# Patient Record
Sex: FEMALE | Race: WHITE | Employment: FULL TIME | URBAN - METROPOLITAN AREA
[De-identification: names, ages, dates, MRNs, and addresses within clinical notes are randomized per-mention and may not be internally consistent; named-entity substitution may affect disease eponyms.]

---

## 2017-09-29 ENCOUNTER — OFFICE VISIT (OUTPATIENT)
Dept: INTERNAL MEDICINE CLINIC | Age: 22
End: 2017-09-29

## 2017-09-29 VITALS
OXYGEN SATURATION: 99 % | DIASTOLIC BLOOD PRESSURE: 73 MMHG | WEIGHT: 192 LBS | HEIGHT: 67 IN | HEART RATE: 80 BPM | SYSTOLIC BLOOD PRESSURE: 119 MMHG | TEMPERATURE: 97.4 F | BODY MASS INDEX: 30.13 KG/M2 | RESPIRATION RATE: 18 BRPM

## 2017-09-29 DIAGNOSIS — N63.0 BREAST LUMP IN LOWER OUTER QUADRANT: Primary | ICD-10-CM

## 2017-09-29 NOTE — PROGRESS NOTES
Yovani Jones presents today for right breast lump x notices last night. Yovani Jones preferred language for health care discussion is english/other. Is someone accompanying this pt? Yes; mother    Is the patient using any DME equipment during [address]? No    Depression Screening:  PHQ over the last two weeks 9/29/2017   Little interest or pleasure in doing things Not at all   Feeling down, depressed or hopeless Not at all   Total Score PHQ 2 0       Learning Assessment:  Completed     Abuse Screening:  Completed    Fall Risk  N/I    Health Maintenance reviewed and discussed per provider. Yes    Yovani Jones is due for influenza. Please order/place referral if appropriate. Advance Directive:  1. Do you have an advance directive in place? Patient Reply: No    2. If not, would you like material regarding how to put one in place? Patient Reply:  no    Coordination of Care:  1. Have you been to the ER, urgent care clinic since your last visit? Hospitalized since your last visit? no    2. Have you seen or consulted any other health care providers outside of the [address] [place] [address] since your last visit? No

## 2017-09-29 NOTE — PROGRESS NOTES
HISTORY OF PRESENT ILLNESS  Naila Villalba is a 25 y.o. female. HPI Comments: Patient noticed a lump in her right breast yesterday, no pain, no drainage. Breast Problem   The history is provided by the patient. This is a new problem. The current episode started yesterday. The problem occurs constantly. The problem has not changed since onset. Pertinent negatives include no chest pain, no abdominal pain and no headaches. Nothing aggravates the symptoms. Nothing relieves the symptoms. She has tried nothing for the symptoms. The treatment provided no relief. Review of Systems   Constitutional: Negative for chills, fever and malaise/fatigue. Cardiovascular: Negative for chest pain and palpitations. Gastrointestinal: Negative for abdominal pain, heartburn, nausea and vomiting. Musculoskeletal: Negative for myalgias. Neurological: Negative for dizziness and headaches. Endo/Heme/Allergies: Negative for environmental allergies and polydipsia. Does not bruise/bleed easily. Psychiatric/Behavioral: Negative for depression. Physical Exam   Constitutional: She is oriented to person, place, and time. She appears well-developed. HENT:   Head: Normocephalic. Cardiovascular: Regular rhythm. Pulmonary/Chest: Breath sounds normal. Right breast exhibits mass. Right breast exhibits no inverted nipple, no nipple discharge, no skin change and no tenderness. Left breast exhibits mass. Left breast exhibits no inverted nipple, no nipple discharge, no skin change and no tenderness. Breasts are symmetrical.       Neurological: She is alert and oriented to person, place, and time. Skin: Skin is dry. No erythema. Psychiatric: She has a normal mood and affect. Nursing note and vitals reviewed. ASSESSMENT and PLAN    ICD-10-CM ICD-9-CM    1. Breast lump in lower outer quadrant N63 611.72 REFERRAL TO GYN ONCOLOGY   patient denied to have mammogram ordered at this time.  She said she will wait till start of next menstrual cycle to re evaluation. Diagnosis and course of action discussed with the patient, she verbalized the understanding.

## 2017-09-29 NOTE — MR AVS SNAPSHOT
Visit Information Date & Time Provider Department Dept. Phone Encounter #  
 9/29/2017  4:15 PM Jennifer Chavez NP [facility] [phone] [id number] Upcoming Health Maintenance Date Due  
 HPV AGE 9Y-34Y (1 of 3 - Female 3 Dose Series) 9/4/2007 Pneumococcal 19-64 Medium Risk (1 of 1 - PPSV23) 9/4/2015 INFLUENZA AGE 9 TO ADULT 8/1/2017 DTaP/Tdap/Td series (1 - Tdap) 9/4/2017 PAP AKA CERVICAL CYTOLOGY 9/4/2017 Allergies as of 9/29/2017  Review Complete On: 9/29/2017 By: Karma Ibrahim LPN No Known Allergies Current Immunizations  Never Reviewed No immunizations on file. Not reviewed this visit You Were Diagnosed With   
  
 Codes Comments Breast lump in lower outer quadrant    -  Primary ICD-10-CM: N63 
ICD-9-CM: 611.72 Vitals BP Pulse Temp Resp Height(growth percentile) Weight(growth percentile) 119/73 (BP 1 Location: Left arm, BP Patient Position: Sitting) 80 97.4 °F (36.3 °C) (Oral) 18 5' 7\" (1.702 m) 192 lb (87.1 kg) LMP SpO2 BMI OB Status Smoking Status 09/13/2017 (Approximate) 99% 30.07 kg/m2 Having regular periods Current Every Day Smoker Vitals History BMI and BSA Data Body Mass Index Body Surface Area 30.07 kg/m 2 2.03 m 2 Preferred Pharmacy Pharmacy Name Phone [address] [address] [phone] Your Updated Medication List  
  
Notice  As of 9/29/2017  4:49 PM  
 You have not been prescribed any medications. We Performed the Following REFERRAL TO GYN ONCOLOGY [BPM91 Custom] Referral Information Referral ID Referred By Referred To  
  
 [id number] Bety Bean Not Available Visits Status Start Date End Date 1 New Request 9/29/17 9/29/18 If your referral has a status of pending review or denied, additional information will be sent to support the outcome of this decision. Patient Instructions Breast Lumps: Care Instructions Your Care Instructions Breast lumps are common, especially in women between ages 27 and 48. Many womens breasts feel lumpy and tender before their menstrual period. Women also may have lumps when they are breastfeeding. Breast lumps may go away after menopause. All new breast lumps in women after menopause should be checked by a doctor. Although lumps may be normal for you, it is important to have your doctor check any lump or thickness that is not like the rest of your breast to make sure it is not cancer. A lump may be larger, harder, or different from the rest of your breast tissue. Follow-up care is a key part of your treatment and safety. Be sure to make and go to all appointments, and call your doctor if you are having problems. Its also a good idea to know your test results and keep a list of the medicines you take. How can you care for yourself at home? · Make an appointment to have a mammogram and other follow-up visits as recommended by your doctor. When should you call for help? Call your doctor now or seek immediate medical care if: 
· You have new changes in a breast, such as: ¨ A lump or thickening in your breast or armpit. ¨ A change in the breast's size or shape. ¨ Skin changes, such as dimples or puckers. ¨ Nipple discharge. ¨ A change in the color or feel of the skin of your breast or the darker area around the nipple (areola). ¨ A change in the shape of the nipple (it may look like it's being pulled into the breast). · You have symptoms of a breast infection, such as: 
¨ Increased pain, swelling, redness, or warmth around a breast. 
¨ Red streaks extending from the breast. 
¨ Pus draining from a breast. 
¨ A fever. Watch closely for changes in your health, and be sure to contact your doctor if: 
· You do not get better as expected. Where can you learn more? Go to http://fransico-duglas.info/. Enter U888 in the search box to learn more about \"Breast Lumps: Care Instructions. \" Current as of: October 13, 2016 Content Version: 11.3 © 0654-7360 [employer]. Care instructions adapted under license by [employer] (which disclaims liability or warranty for this information). If you have questions about a medical condition or this instruction, always ask your healthcare professional. [place] [address] any warranty or liability for your use of this information. Introducing [facility]! [employer] introduces [employer] patient portal. Now you can access parts of your medical record, email your doctor's office, and request medication refills online. 1. In your internet browser, go to [email or web address] 2. Click on the First Time User? Click Here link in the Sign In box. You will see the New Member Sign Up page. 3. Enter your [employer] Access Code exactly as it appears below. You will not need to use this code after youve completed the sign-up process. If you do not sign up before the expiration date, you must request a new code. · [employer] Access Code: [email or web address] Expires: 12/28/2017  4:48 PM 
 
4. Enter the last four digits of your Social Security Number (xxxx) and Date of Birth (mm/dd/yyyy) as indicated and click Submit. You will be taken to the next sign-up page. 5. Create a [employer] ID. This will be your [employer] login ID and cannot be changed, so think of one that is secure and easy to remember. 6. Create a [employer] password. You can change your password at any time. 7. Enter your Password Reset Question and Answer. This can be used at a later time if you forget your password. 8. Enter your e-mail address. You will receive e-mail notification when new information is available in [address] 9. Click Sign Up. You can now view and download portions of your medical record. 10. Click the Download Summary menu link to download a portable copy of your medical information. If you have questions, please visit the Frequently Asked Questions section of the [email or web address] website. Remember, [email or web address] is NOT to be used for urgent needs. For medical emergencies, dial 911. Now available from your iPhone and Android! Please provide this summary of care documentation to your next provider. Your primary care clinician is listed as NONE. If you have any questions after today's visit, please call [phone].

## 2017-09-29 NOTE — PATIENT INSTRUCTIONS
Breast Lumps: Care Instructions  Your Care Instructions  Breast lumps are common, especially in women between ages 27 and 48. Many womens breasts feel lumpy and tender before their menstrual period. Women also may have lumps when they are breastfeeding. Breast lumps may go away after menopause. All new breast lumps in women after menopause should be checked by a doctor. Although lumps may be normal for you, it is important to have your doctor check any lump or thickness that is not like the rest of your breast to make sure it is not cancer. A lump may be larger, harder, or different from the rest of your breast tissue. Follow-up care is a key part of your treatment and safety. Be sure to make and go to all appointments, and call your doctor if you are having problems. Its also a good idea to know your test results and keep a list of the medicines you take. How can you care for yourself at home? · Make an appointment to have a mammogram and other follow-up visits as recommended by your doctor. When should you call for help? Call your doctor now or seek immediate medical care if:  · You have new changes in a breast, such as:  ¨ A lump or thickening in your breast or armpit. ¨ A change in the breast's size or shape. ¨ Skin changes, such as dimples or puckers. ¨ Nipple discharge. ¨ A change in the color or feel of the skin of your breast or the darker area around the nipple (areola). ¨ A change in the shape of the nipple (it may look like it's being pulled into the breast). · You have symptoms of a breast infection, such as:  ¨ Increased pain, swelling, redness, or warmth around a breast.  ¨ Red streaks extending from the breast.  ¨ Pus draining from a breast.  ¨ A fever. Watch closely for changes in your health, and be sure to contact your doctor if:  · You do not get better as expected. Where can you learn more? Go to http://fransico-duglas.info/.   Enter Y675 in the search box to learn more about \"Breast Lumps: Care Instructions. \"  Current as of: October 13, 2016  Content Version: 11.3  © 0560-2832 [employer]. Care instructions adapted under license by [employer] (which disclaims liability or warranty for this information). If you have questions about a medical condition or this instruction, always ask your healthcare professional. [address] any warranty or liability for your use of this information.

## 2019-05-21 PROBLEM — Z34.90 NORMAL PREGNANCY, ANTEPARTUM: Status: ACTIVE | Noted: 2019-05-21

## 2019-05-22 PROBLEM — Z3A.38 38 WEEKS GESTATION OF PREGNANCY: Status: ACTIVE | Noted: 2019-05-22

## 2021-07-01 PROBLEM — O23.599 BACTERIAL VAGINOSIS IN PREGNANCY: Status: ACTIVE | Noted: 2021-07-01

## 2021-07-01 PROBLEM — B96.89 BACTERIAL VAGINOSIS IN PREGNANCY: Status: ACTIVE | Noted: 2021-07-01

## 2021-07-01 PROBLEM — O99.323 MATERNAL DRUG USE COMPLICATING PREGNANCY IN THIRD TRIMESTER, ANTEPARTUM: Status: ACTIVE | Noted: 2021-07-01

## 2021-07-01 PROBLEM — O47.03 THREATENED PRETERM LABOR, THIRD TRIMESTER: Status: ACTIVE | Noted: 2021-07-01

## 2021-07-25 PROBLEM — O09.219 CURRENT PREGNANCY WITH HISTORY OF PRETERM LABOR: Status: ACTIVE | Noted: 2021-07-25

## 2021-07-25 PROBLEM — Z37.9 NORMAL LABOR: Status: ACTIVE | Noted: 2021-07-25

## 2022-06-02 PROBLEM — Z3A.38 38 WEEKS GESTATION OF PREGNANCY: Status: RESOLVED | Noted: 2019-05-22 | Resolved: 2022-06-02

## 2022-06-02 PROBLEM — O47.03 THREATENED PRETERM LABOR, THIRD TRIMESTER: Status: RESOLVED | Noted: 2021-07-01 | Resolved: 2022-06-02

## 2022-06-02 PROBLEM — N63.0 BREAST LUMP IN LOWER OUTER QUADRANT: Status: RESOLVED | Noted: 2017-09-29 | Resolved: 2022-06-02

## 2022-06-02 PROBLEM — O99.323 MATERNAL DRUG USE COMPLICATING PREGNANCY IN THIRD TRIMESTER, ANTEPARTUM: Status: RESOLVED | Noted: 2021-07-01 | Resolved: 2022-06-02

## 2022-06-02 PROBLEM — Z3A.33 33 WEEKS GESTATION OF PREGNANCY: Status: ACTIVE | Noted: 2022-06-02

## 2022-06-02 PROBLEM — B96.89 BACTERIAL VAGINOSIS IN PREGNANCY: Status: RESOLVED | Noted: 2021-07-01 | Resolved: 2022-06-02

## 2022-06-02 PROBLEM — Z37.9 NORMAL LABOR: Status: RESOLVED | Noted: 2021-07-25 | Resolved: 2022-06-02

## 2022-06-02 PROBLEM — O23.599 BACTERIAL VAGINOSIS IN PREGNANCY: Status: RESOLVED | Noted: 2021-07-01 | Resolved: 2022-06-02

## 2022-06-02 PROBLEM — Z34.90 NORMAL PREGNANCY, ANTEPARTUM: Status: RESOLVED | Noted: 2019-05-21 | Resolved: 2022-06-02

## 2022-06-02 PROBLEM — O09.219 CURRENT PREGNANCY WITH HISTORY OF PRETERM LABOR: Status: RESOLVED | Noted: 2021-07-25 | Resolved: 2022-06-02

## 2022-06-02 PROBLEM — O47.9 UTERINE CONTRACTIONS: Status: ACTIVE | Noted: 2022-06-02

## 2022-07-05 PROBLEM — B18.2 CHRONIC HEPATITIS C VIRUS INFECTION (HCC): Status: ACTIVE | Noted: 2022-07-05

## 2022-07-05 PROBLEM — Z37.9 NORMAL LABOR: Status: ACTIVE | Noted: 2022-07-05
